# Patient Record
Sex: MALE | Race: WHITE | NOT HISPANIC OR LATINO | Employment: OTHER | URBAN - METROPOLITAN AREA
[De-identification: names, ages, dates, MRNs, and addresses within clinical notes are randomized per-mention and may not be internally consistent; named-entity substitution may affect disease eponyms.]

---

## 2022-09-23 ENCOUNTER — OFFICE VISIT (OUTPATIENT)
Dept: FAMILY MEDICINE CLINIC | Facility: CLINIC | Age: 47
End: 2022-09-23

## 2022-09-23 VITALS
TEMPERATURE: 98.4 F | SYSTOLIC BLOOD PRESSURE: 122 MMHG | OXYGEN SATURATION: 100 % | HEART RATE: 73 BPM | BODY MASS INDEX: 33.06 KG/M2 | DIASTOLIC BLOOD PRESSURE: 88 MMHG | HEIGHT: 69 IN | WEIGHT: 223.2 LBS

## 2022-09-23 DIAGNOSIS — I10 PRIMARY HYPERTENSION: ICD-10-CM

## 2022-09-23 DIAGNOSIS — E11.9 TYPE 2 DIABETES MELLITUS WITHOUT COMPLICATION, WITHOUT LONG-TERM CURRENT USE OF INSULIN: ICD-10-CM

## 2022-09-23 DIAGNOSIS — R59.0 THORACIC LYMPHADENOPATHY: ICD-10-CM

## 2022-09-23 DIAGNOSIS — N39.0 URINARY TRACT INFECTION WITH HEMATURIA, SITE UNSPECIFIED: Primary | ICD-10-CM

## 2022-09-23 DIAGNOSIS — R31.9 URINARY TRACT INFECTION WITH HEMATURIA, SITE UNSPECIFIED: Primary | ICD-10-CM

## 2022-09-23 DIAGNOSIS — Z71.6 ENCOUNTER FOR SMOKING CESSATION COUNSELING: ICD-10-CM

## 2022-09-23 LAB
EXPIRATION DATE: ABNORMAL
HBA1C MFR BLD: 7.5 %
Lab: ABNORMAL

## 2022-09-23 PROCEDURE — 83036 HEMOGLOBIN GLYCOSYLATED A1C: CPT | Performed by: STUDENT IN AN ORGANIZED HEALTH CARE EDUCATION/TRAINING PROGRAM

## 2022-09-23 PROCEDURE — 99204 OFFICE O/P NEW MOD 45 MIN: CPT | Performed by: STUDENT IN AN ORGANIZED HEALTH CARE EDUCATION/TRAINING PROGRAM

## 2022-09-23 PROCEDURE — 3051F HG A1C>EQUAL 7.0%<8.0%: CPT | Performed by: STUDENT IN AN ORGANIZED HEALTH CARE EDUCATION/TRAINING PROGRAM

## 2022-09-23 NOTE — PROGRESS NOTES
"Chief Complaint  Establish Care (Pt went to [facility] ER 9/17/22 was treated for UTI, stated his sugar was elevated, and his lymphnodes were enlarged on a CT that he had done, care everywhere updated) and Hypertension    Subjective        Pablo Wise presents to [facility]  History of Present Illness  Patient presenting to establish care and for ER follow-up due to cystitis and blood in his urine on 9/17/2022.  He was a previous patient of JAE Bedolla, but has not seen her for years and has never been seen in this clinic.  He reports being healthy for the past few years and having no medical problems until the day he went to the ER he was having bilateral back pain around his kidneys and had blood in his urine.  In the ER they did a CT scan, blood work, and UA, identifying cystitis.  He was started on antibiotic and scheduled for a follow-up with Dr. Terry, urology, on 9/28/2022 in [place].  He was also noted to have elevated blood sugar, with a CT showing mild hepatic steatosis and lymphadenopathy adjacent to the distal esophagus.    At this point, patient's symptoms have resolved.  He has been eating, drinking, urinating, having regular bowel movements.  He denies fevers.  He indicates he had an elevated A1c in the past and was diagnosed with T2DM, although he lost weight and improved his diet and was ultimately taken off of metformin.  He mentioned that he had gained some weight but his weight has overall been stable for the past few years.  He has smoked for a long time and continues to do so.          Objective   Vital Signs:  /88   Pulse 73   Temp 98.4 °F (36.9 °C)   Ht 175.3 cm (69\")   Wt 101 kg (223 lb 3.2 oz)   SpO2 100%   BMI 32.96 kg/m²   Estimated body mass index is 32.96 kg/m² as calculated from the following:    Height as of this encounter: 175.3 cm (69\").    Weight as of this encounter: 101 kg (223 lb 3.2 oz).          Physical Exam  Vitals reviewed. "   Constitutional:       General: He is not in acute distress.     Appearance: Normal appearance.   HENT:      Head: Normocephalic and atraumatic.   Eyes:      Extraocular Movements: Extraocular movements intact.      Conjunctiva/sclera: Conjunctivae normal.   Cardiovascular:      Rate and Rhythm: Normal rate and regular rhythm.      Heart sounds: Normal heart sounds. No murmur heard.    No friction rub. No gallop.   Pulmonary:      Effort: Pulmonary effort is normal.      Breath sounds: Normal breath sounds. No wheezing, rhonchi or rales.   Abdominal:      General: Bowel sounds are normal. There is no distension.      Palpations: Abdomen is soft.      Tenderness: There is no abdominal tenderness. There is no right CVA tenderness, left CVA tenderness or guarding.   Musculoskeletal:      Right lower leg: No edema.      Left lower leg: No edema.   Neurological:      General: No focal deficit present.      Mental Status: He is alert. Mental status is at baseline.        Result Review :                Assessment and Plan   Diagnoses and all orders for this visit:    1. Urinary tract infection with hematuria, site unspecified (Primary)    2. Type 2 diabetes mellitus without complication, without long-term current use of insulin (HCC)  -     POC Glycosylated Hemoglobin (Hb A1C)  -     metFORMIN (Glucophage) 500 MG tablet; Take 1 tablet by mouth 2 (Two) Times a Day With Meals.  Dispense: 180 tablet; Refill: 3    3. Primary hypertension    4. Thoracic lymphadenopathy  -     CT Chest With Contrast; Future    5. Encounter for smoking cessation counseling    UTI symptoms and hematuria resolved with antibiotics provided in ER on 9/17/2022.  Review of CT abdomen/pelvis from that visit shows suggestion of cystitis with bladder wall thickening.  Patient's smoking history mixed bladder wall thickening more concerning.  Encourage patient to keep the follow-up previously scheduled with Dr. Terry, urology, on 9/28/2022 in  [place].    CT also showing mild hepatic steatosis and lymphadenopathy adjacent to the distal esophagus.  Repeat chest CT ordered to further evaluate lymphadenopathy as recommended by radiologist.  Encouraged to improve diet to help with likely fatty liver and diabetes management.  Labs and UA from 917/22 ER visit reviewed and consistent with his acute symptoms.  We will recheck labs, including lipids, at next next visit during annual exam.    A1c in clinic today was 7.5.  Counseled on dietary improvement, exercise, weight loss.  Encouraged eye exam.  Denies vision change, neuropathy/loss of sensation in lower extremities.  Restarting metformin.  Recommended nutrition consult but patient declined at this time.    Counseled for smoking cessation.  Patient indicating he has tried in the past without success, even using Chantix.  Discussed taking a date, changing routine, considering nicotine replacement such as lozenges and gum when he is having craving.  Patient precontemplative.       Follow Up   Return in about 4 weeks (around 10/21/2022) for Annual physical, hematuria.  Patient was given instructions and counseling regarding his condition or for health maintenance advice. Please see specific information pulled into the AVS if appropriate.

## 2022-09-23 NOTE — PATIENT INSTRUCTIONS
Your A1c was 7.5 today.  Trying to cut back on sugary drinks and other sources of sugars/carbohydrates like desserts, breads, pizza, pasta can help you control your blood sugar with diet.  You may want to look at the whole food plant-based diet online, eats a very healthy diet for the whole family.  We will try to get your records from your emergency room visit and follow-up with you if there are other things we need to order in the near term.  Please be sure to keep your appointment with urology.

## 2022-09-26 ENCOUNTER — TELEPHONE (OUTPATIENT)
Dept: FAMILY MEDICINE CLINIC | Facility: CLINIC | Age: 47
End: 2022-09-26

## 2022-09-26 NOTE — TELEPHONE ENCOUNTER
Caller: Rosie Wise    Relationship: Emergency Contact    Best call back number:     Caller requesting test results: PATIENTS WIFE    What test was performed: CT SCAN    When was the test performed: 09/17/22    Where was the test performed: [facility]    Additional notes: PATIENTS WIFE ROSIE IS CALLING IN TO REQUEST THE PATIENTS RESULTS FROM HIS CT SCAN. SHE WANTS TO BE CALLED AS TO WHEN SHE CAN PICK THIS UP FROM THE OFFICE.

## 2022-09-26 NOTE — TELEPHONE ENCOUNTER
Caller: Rosie Wise    Relationship: Emergency Contact    Best call back number: [phone]*    What is the best time to reach you: ANYTIME    Who are you requesting to speak with (clinical staff, provider,  specific staff member): CLINICAL    What was the call regarding: PATIENT'S WIFE REQUEST A CALL BACK WITH THE STATUS OF THE CT CHEST REFERRAL.    Do you require a callback: YES

## 2022-09-30 ENCOUNTER — PATIENT ROUNDING (BHMG ONLY) (OUTPATIENT)
Dept: FAMILY MEDICINE CLINIC | Facility: CLINIC | Age: 47
End: 2022-09-30

## 2022-09-30 NOTE — PROGRESS NOTES
My name is Cecil Moya      I am the Practice Manager with   [facility] [address]  [facility] [address] [phone].      I am messaging to officially welcome you to our practice and ask about your recent visit.     Tell me about your visit with us. What things went well?         We're always looking for ways to make our patients' experiences even better. Do you have recommendations on ways we may improve?       Overall were you satisfied with your first visit to our practice?        Is there anything else I can do for you?       Thank you, and have a great day.

## 2022-10-05 ENCOUNTER — APPOINTMENT (OUTPATIENT)
Dept: CT IMAGING | Facility: HOSPITAL | Age: 47
End: 2022-10-05

## 2022-10-26 ENCOUNTER — OFFICE VISIT (OUTPATIENT)
Dept: FAMILY MEDICINE CLINIC | Facility: CLINIC | Age: 47
End: 2022-10-26

## 2022-10-26 VITALS
DIASTOLIC BLOOD PRESSURE: 88 MMHG | HEART RATE: 82 BPM | WEIGHT: 230.8 LBS | TEMPERATURE: 95.3 F | BODY MASS INDEX: 34.18 KG/M2 | OXYGEN SATURATION: 96 % | HEIGHT: 69 IN | SYSTOLIC BLOOD PRESSURE: 134 MMHG

## 2022-10-26 DIAGNOSIS — I10 PRIMARY HYPERTENSION: ICD-10-CM

## 2022-10-26 DIAGNOSIS — F17.200 TOBACCO DEPENDENCY: ICD-10-CM

## 2022-10-26 DIAGNOSIS — E11.9 TYPE 2 DIABETES MELLITUS WITHOUT COMPLICATION, WITHOUT LONG-TERM CURRENT USE OF INSULIN: Primary | ICD-10-CM

## 2022-10-26 LAB
ALBUMIN SERPL-MCNC: 4.4 G/DL (ref 3.5–5.2)
ALBUMIN/GLOB SERPL: 1.6 G/DL
ALP SERPL-CCNC: 86 U/L (ref 39–117)
ALT SERPL-CCNC: 16 U/L (ref 1–41)
AST SERPL-CCNC: 17 U/L (ref 1–40)
BASOPHILS # BLD AUTO: 0.09 10*3/MM3 (ref 0–0.2)
BASOPHILS NFR BLD AUTO: 1.2 % (ref 0–1.5)
BILIRUB SERPL-MCNC: 0.3 MG/DL (ref 0–1.2)
BUN SERPL-MCNC: 12 MG/DL (ref 6–20)
BUN/CREAT SERPL: 11.9 (ref 7–25)
CALCIUM SERPL-MCNC: 9.5 MG/DL (ref 8.6–10.5)
CHLORIDE SERPL-SCNC: 101 MMOL/L (ref 98–107)
CHOLEST SERPL-MCNC: 236 MG/DL (ref 0–200)
CO2 SERPL-SCNC: 28.1 MMOL/L (ref 22–29)
CREAT SERPL-MCNC: 1.01 MG/DL (ref 0.76–1.27)
EGFRCR SERPLBLD CKD-EPI 2021: 91.2 ML/MIN/1.73
EOSINOPHIL # BLD AUTO: 0.37 10*3/MM3 (ref 0–0.4)
EOSINOPHIL NFR BLD AUTO: 5.1 % (ref 0.3–6.2)
ERYTHROCYTE [DISTWIDTH] IN BLOOD BY AUTOMATED COUNT: 12.9 % (ref 12.3–15.4)
GLOBULIN SER CALC-MCNC: 2.8 GM/DL
GLUCOSE SERPL-MCNC: 210 MG/DL (ref 65–99)
HCT VFR BLD AUTO: 42.5 % (ref 37.5–51)
HDLC SERPL-MCNC: 37 MG/DL (ref 40–60)
HGB BLD-MCNC: 14.7 G/DL (ref 13–17.7)
IMM GRANULOCYTES # BLD AUTO: 0.03 10*3/MM3 (ref 0–0.05)
IMM GRANULOCYTES NFR BLD AUTO: 0.4 % (ref 0–0.5)
LDLC SERPL CALC-MCNC: 177 MG/DL (ref 0–100)
LYMPHOCYTES # BLD AUTO: 2.23 10*3/MM3 (ref 0.7–3.1)
LYMPHOCYTES NFR BLD AUTO: 30.8 % (ref 19.6–45.3)
MCH RBC QN AUTO: 29.9 PG (ref 26.6–33)
MCHC RBC AUTO-ENTMCNC: 34.6 G/DL (ref 31.5–35.7)
MCV RBC AUTO: 86.6 FL (ref 79–97)
MONOCYTES # BLD AUTO: 0.5 10*3/MM3 (ref 0.1–0.9)
MONOCYTES NFR BLD AUTO: 6.9 % (ref 5–12)
NEUTROPHILS # BLD AUTO: 4.03 10*3/MM3 (ref 1.7–7)
NEUTROPHILS NFR BLD AUTO: 55.6 % (ref 42.7–76)
NRBC BLD AUTO-RTO: 0 /100 WBC (ref 0–0.2)
PLATELET # BLD AUTO: 226 10*3/MM3 (ref 140–450)
POTASSIUM SERPL-SCNC: 4.8 MMOL/L (ref 3.5–5.2)
PROT SERPL-MCNC: 7.2 G/DL (ref 6–8.5)
RBC # BLD AUTO: 4.91 10*6/MM3 (ref 4.14–5.8)
SODIUM SERPL-SCNC: 137 MMOL/L (ref 136–145)
TRIGL SERPL-MCNC: 119 MG/DL (ref 0–150)
TSH SERPL DL<=0.005 MIU/L-ACNC: 1.81 UIU/ML (ref 0.27–4.2)
VLDLC SERPL CALC-MCNC: 22 MG/DL (ref 5–40)
WBC # BLD AUTO: 7.25 10*3/MM3 (ref 3.4–10.8)

## 2022-10-26 PROCEDURE — 99214 OFFICE O/P EST MOD 30 MIN: CPT | Performed by: STUDENT IN AN ORGANIZED HEALTH CARE EDUCATION/TRAINING PROGRAM

## 2022-10-26 NOTE — PATIENT INSTRUCTIONS
Keep up the good work on the diet changes.  Try to avoid fatty red meats and breads, as well as the sweets.  Do your best to cut the sugar out of the coffee.    Try to cut back on food intake.  Try to fill up on vegetables that do not have as many calories in them instead of meats and fried foods.    If you check your blood sugar at home, try to write it down and bring it next time.  The best time to check it is first thing in the morning before you have had anything to drink other than water.  Other times that are useful are before meals, 2 hours after meals, bedtime, and occasionally in the middle of the night.    Feel free to give us a call if you have concerns prior to next visit.

## 2022-10-26 NOTE — PROGRESS NOTES
"Chief Complaint  Type 2 diabetes mellitus without complication, without long and Follow-up    Subjective        Pablo Wise presents to [facility]  History of Present Illness  Pablo is presenting today for follow-up after cystitis 6 weeks ago, T2DM was also identified in the ER at that time.  He reports Dr. Terry, urology, perform cystoscopy on 9/28/2022 and found no abnormalities.  He also reports his repeat chest CT has been scheduled on 12/5/2022.    Patient reports cutting back significantly on sweets and try to eat more healthy since identifying elevated A1c of 7.5 on 9/23/2022.  He reports last eye exam was 6/2022.  He denies changes in vision, excessive thirst, excessive urination, changes in bowel or urinary routine, changes in sensation in extremities.  He reports his wife is checking his BG at home occasionally but did not bring measurements with him today.    Patient lives at home with wife.  He has 2 daughters, 1 is nearby, and the other is in [place].  He sees them regularly.  Reviewed family history.  Patient continues to smoke but has cut back.  He is now chewing gum when he gets craving in his truck and is not smoking is any cigarettes in his truck.  He works as a .    Objective   Vital Signs:  /88 (BP Location: Left arm, Patient Position: Sitting, Cuff Size: Adult)   Pulse 82   Temp 95.3 °F (35.2 °C)   Ht 175.3 cm (69\")   Wt 105 kg (230 lb 12.8 oz)   SpO2 96%   BMI 34.08 kg/m²   Estimated body mass index is 34.08 kg/m² as calculated from the following:    Height as of this encounter: 175.3 cm (69\").    Weight as of this encounter: 105 kg (230 lb 12.8 oz).          Physical Exam  Vitals reviewed.   Constitutional:       General: He is not in acute distress.     Appearance: Normal appearance.   HENT:      Head: Normocephalic and atraumatic.   Eyes:      Extraocular Movements: Extraocular movements intact.      Conjunctiva/sclera: " Order placed for FLU per Verbal Order from Dr. Paola Gómez on 10/30/2020 due to need.     FLU injection given in right deltoid 0.5 ml given in right arm IM, no reaction noted Conjunctivae normal.   Cardiovascular:      Rate and Rhythm: Normal rate and regular rhythm.      Heart sounds: Normal heart sounds. No murmur heard.    No friction rub. No gallop.   Pulmonary:      Effort: Pulmonary effort is normal.      Breath sounds: Normal breath sounds. No wheezing, rhonchi or rales.   Abdominal:      General: Bowel sounds are normal. There is no distension.      Palpations: Abdomen is soft.      Tenderness: There is no abdominal tenderness. There is no right CVA tenderness, left CVA tenderness or guarding.   Musculoskeletal:      Right lower leg: No edema.      Left lower leg: No edema.   Neurological:      General: No focal deficit present.      Mental Status: He is alert. Mental status is at baseline.   Psychiatric:         Mood and Affect: Mood normal.         Behavior: Behavior normal.        Result Review :                Assessment and Plan   Diagnoses and all orders for this visit:    1. Type 2 diabetes mellitus without complication, without long-term current use of insulin (HCC) (Primary)  -     CBC & Differential  -     Comprehensive Metabolic Panel  -     Lipid Panel  -     TSH Rfx On Abnormal To Free T4  -     Urinalysis With Microscopic - Urine, Clean Catch; Future  -     Microalbumin / Creatinine Urine Ratio - Urine, Clean Catch; Future  -     Microalbumin / Creatinine Urine Ratio - Urine, Clean Catch  -     Urinalysis With Microscopic - Urine, Clean Catch    2. Primary hypertension  -     CBC & Differential  -     Comprehensive Metabolic Panel  -     Lipid Panel  -     TSH Rfx On Abnormal To Free T4    3. Tobacco dependency    Pablo is presenting today for follow-up after cystitis 6 weeks ago, T2DM was also identified in the ER at that time.  He reports Dr. Terry, urology, perform cystoscopy on 9/28/2022 and found no abnormalities.  He also reports his repeat chest CT has been scheduled on 12/5/2022.    Discussed ways to improve diet to help manage blood glucose as well as  achieve meaningful weight loss.  Continuing metformin at current dosage until A1c is checked again in 3 months.  Discussed when to regularly check blood glucose with fingersticks at home.  Encouraged bringing BG log to next visit.  Discussed role for statins and ACE inhibitors with T2DM.  Awaiting lab results ordered today to determine appropriate medications to start for HTN and lipids.  Patient has had recent eye exam a few months ago.  He reports good sensation in his extremities bilaterally.    BP noted to be slightly above goal today.  Patient has been on lisinopril in the past.  Encouraged continuing to cut back on smoking and discussed importance of picking a quit date.         Follow Up   Return in about 3 months (around 1/26/2023), or T2DM, HTN, Weight loss, Smoking.  Patient was given instructions and counseling regarding his condition or for health maintenance advice. Please see specific information pulled into the AVS if appropriate.

## 2022-10-27 LAB
ALBUMIN/CREAT UR: <9 MG/G CREAT (ref 0–29)
APPEARANCE UR: CLEAR
BACTERIA #/AREA URNS HPF: NORMAL /HPF
BILIRUB UR QL STRIP: NEGATIVE
CASTS URNS MICRO: NORMAL
COLOR UR: YELLOW
CREAT UR-MCNC: 33.4 MG/DL
EPI CELLS #/AREA URNS HPF: NORMAL /HPF
GLUCOSE UR QL STRIP: ABNORMAL
HGB UR QL STRIP: NEGATIVE
KETONES UR QL STRIP: NEGATIVE
LEUKOCYTE ESTERASE UR QL STRIP: NEGATIVE
MICROALBUMIN UR-MCNC: <3 UG/ML
NITRITE UR QL STRIP: NEGATIVE
PH UR STRIP: 7.5 [PH] (ref 5–8)
PROT UR QL STRIP: NEGATIVE
RBC #/AREA URNS HPF: NORMAL /HPF
SP GR UR STRIP: 1.01 (ref 1–1.03)
UROBILINOGEN UR STRIP-MCNC: ABNORMAL MG/DL
WBC #/AREA URNS HPF: NORMAL /HPF

## 2022-10-28 ENCOUNTER — TELEPHONE (OUTPATIENT)
Dept: FAMILY MEDICINE CLINIC | Facility: CLINIC | Age: 47
End: 2022-10-28

## 2022-10-28 DIAGNOSIS — E11.9 TYPE 2 DIABETES MELLITUS WITHOUT COMPLICATION, WITHOUT LONG-TERM CURRENT USE OF INSULIN: Primary | ICD-10-CM

## 2022-10-28 NOTE — TELEPHONE ENCOUNTER
Caller: Rosie Wise    Relationship: Emergency Contact    Best call back number: [phone]    What medication are you requesting: GLUCOSE METER    If a prescription is needed, what is your preferred pharmacy and phone number: [facility] [id number] - [place], [place] - [address] [place] [address] AT  [address] - [phone]  - [phone] FX     Additional notes: PATIENTS WIFE STATED PATIENT IS NEEDING A GLUCOSE METER TO READ HIS SUGAR.

## 2022-10-30 RX ORDER — BLOOD-GLUCOSE METER
1 KIT MISCELLANEOUS AS NEEDED
Qty: 1 EACH | Refills: 0 | Status: SHIPPED | OUTPATIENT
Start: 2022-10-30 | End: 2022-11-03

## 2022-10-30 RX ORDER — LANCETS
EACH MISCELLANEOUS
Qty: 60 EACH | Refills: 12 | Status: SHIPPED | OUTPATIENT
Start: 2022-10-30

## 2022-10-31 ENCOUNTER — TELEPHONE (OUTPATIENT)
Dept: FAMILY MEDICINE CLINIC | Facility: CLINIC | Age: 47
End: 2022-10-31

## 2022-10-31 NOTE — TELEPHONE ENCOUNTER
Dr. Benz,    Patient's wife states Mr. Wise test his Blood sugar 6 times a day.  He will need more testing strips.    Please advise on filling for more test strips per day.    Maricel

## 2022-10-31 NOTE — TELEPHONE ENCOUNTER
Pharmacy Name:   [facility] [id number] - [address] AT  [address] - [phone]  - [phone]                    Pharmacy representative name:     Pharmacy representative phone number: [phone]    What medication are you calling in regards to:glucose blood test strip     What question does the pharmacy have: PRESCRIPTION QTY 60 STRIPS. IF PATIENT IS TESTING 6 TIMES PER DAY, THIS IS ONLY A 10 DAY SUPPLY OF STRIPS.  PLEASE ADVISE     Who is the provider that prescribed the medication: DEEPA      Additional notes:

## 2022-11-03 ENCOUNTER — TELEPHONE (OUTPATIENT)
Dept: FAMILY MEDICINE CLINIC | Facility: CLINIC | Age: 47
End: 2022-11-03

## 2022-11-03 DIAGNOSIS — E11.9 TYPE 2 DIABETES MELLITUS WITHOUT COMPLICATION, WITHOUT LONG-TERM CURRENT USE OF INSULIN: ICD-10-CM

## 2022-11-03 RX ORDER — BLOOD-GLUCOSE METER
1 KIT MISCELLANEOUS AS NEEDED
Qty: 1 EACH | Refills: 0 | Status: SHIPPED | OUTPATIENT
Start: 2022-11-03 | End: 2023-11-03

## 2022-11-03 NOTE — TELEPHONE ENCOUNTER
Pharmacy Name:      Pharmacy representative name: NITA    Pharmacy representative phone number: [phone]    What medication are you calling in regards to: LANCETS AND TEST STRIPS     What question does the pharmacy have:   SAYS THAT HE SHOULD TEST 6 TIMES PER DAY AND DO WE NEED 30 DAY SUPPLY.     Who is the provider that prescribed the medication: RAFAELA   normal...

## 2022-11-04 NOTE — TELEPHONE ENCOUNTER
Pharmacy called asking for clarification on his testing.  I advised it was 2 times daily.    The pharmacist stated it comes in 25 or 100 count strips.  I gave 100 strips with 1 refill.    Maricel

## 2022-12-19 ENCOUNTER — HOSPITAL ENCOUNTER (OUTPATIENT)
Dept: CT IMAGING | Facility: HOSPITAL | Age: 47
Discharge: HOME | End: 2022-12-19

## 2022-12-19 ENCOUNTER — APPOINTMENT (OUTPATIENT)
Dept: OTHER | Facility: HOSPITAL | Age: 47
End: 2022-12-19

## 2022-12-19 DIAGNOSIS — Z09 FOLLOW-UP EXAM: ICD-10-CM

## 2022-12-19 DIAGNOSIS — R59.0 THORACIC LYMPHADENOPATHY: ICD-10-CM

## 2022-12-19 LAB — CREAT BLDA-MCNC: 1.1 MG/DL (ref 0.6–1.3)

## 2022-12-19 PROCEDURE — 71260 CT THORAX DX C+: CPT

## 2022-12-19 PROCEDURE — 25010000002 IOPAMIDOL 61 % SOLUTION: Performed by: STUDENT IN AN ORGANIZED HEALTH CARE EDUCATION/TRAINING PROGRAM

## 2022-12-19 PROCEDURE — 82565 ASSAY OF CREATININE: CPT

## 2022-12-19 RX ADMIN — IOPAMIDOL 75 ML: 612 INJECTION, SOLUTION INTRAVENOUS at 10:48

## 2023-02-07 ENCOUNTER — OFFICE VISIT (OUTPATIENT)
Dept: FAMILY MEDICINE CLINIC | Facility: CLINIC | Age: 48
End: 2023-02-07
Payer: COMMERCIAL

## 2023-02-07 VITALS
SYSTOLIC BLOOD PRESSURE: 120 MMHG | OXYGEN SATURATION: 100 % | WEIGHT: 213.4 LBS | DIASTOLIC BLOOD PRESSURE: 80 MMHG | HEART RATE: 70 BPM | BODY MASS INDEX: 31.61 KG/M2 | TEMPERATURE: 98.4 F | HEIGHT: 69 IN

## 2023-02-07 DIAGNOSIS — F17.200 TOBACCO DEPENDENCY: ICD-10-CM

## 2023-02-07 DIAGNOSIS — E11.9 TYPE 2 DIABETES MELLITUS WITHOUT COMPLICATION, WITHOUT LONG-TERM CURRENT USE OF INSULIN: ICD-10-CM

## 2023-02-07 DIAGNOSIS — G93.39 OTHER POST INFECTION AND RELATED FATIGUE SYNDROMES: ICD-10-CM

## 2023-02-07 DIAGNOSIS — U07.1 COVID-19 VIRUS INFECTION: Primary | ICD-10-CM

## 2023-02-07 DIAGNOSIS — R06.02 SHORTNESS OF BREATH: ICD-10-CM

## 2023-02-07 LAB
EXPIRATION DATE: NORMAL
HBA1C MFR BLD: 8.1 %
Lab: 543

## 2023-02-07 PROCEDURE — 99214 OFFICE O/P EST MOD 30 MIN: CPT | Performed by: STUDENT IN AN ORGANIZED HEALTH CARE EDUCATION/TRAINING PROGRAM

## 2023-02-07 PROCEDURE — 3052F HG A1C>EQUAL 8.0%<EQUAL 9.0%: CPT | Performed by: STUDENT IN AN ORGANIZED HEALTH CARE EDUCATION/TRAINING PROGRAM

## 2023-02-07 PROCEDURE — 83036 HEMOGLOBIN GLYCOSYLATED A1C: CPT | Performed by: STUDENT IN AN ORGANIZED HEALTH CARE EDUCATION/TRAINING PROGRAM

## 2023-02-07 RX ORDER — ALBUTEROL SULFATE 90 UG/1
2 AEROSOL, METERED RESPIRATORY (INHALATION) EVERY 6 HOURS PRN
Qty: 18 G | Refills: 4 | Status: SHIPPED | OUTPATIENT
Start: 2023-02-07

## 2023-02-07 NOTE — PROGRESS NOTES
"Chief Complaint  Follow-up (Hospital follow up. Covid and Pneumonia.), Fatigue, and Diabetes    Subjective        Pablo Wise presents to [facility]  History of Present Illness  Pablo is an established patient presenting for follow-up after diagnosis with COVID on 2/2/2023.  He was started on Paxlovid at that time.  He reports continued fatigue, occasional shortness of breath, cough.  He denies fevers, palpitations, changes in hearing, changes in vision, headache.  He has been unable to perform his  job during this illness.  He has completed Paxlovid but is using an albuterol inhaler occasionally which he had from a previous illness which helps with his shortness of breath.    He reports improved diet since diagnosis of T2DM 9/2022.  He also reports compliance with his metformin, 500 mg twice daily.  He is reporting morning FBGs's in the low 200s.    He has also reported cutting back on smoking since being diagnosed with COVID.  He has been smoking no more than 3 cigarettes a day.    Objective   Vital Signs:  /80   Pulse 70   Temp 98.4 °F (36.9 °C)   Ht 175.3 cm (69\")   Wt 96.8 kg (213 lb 6.4 oz)   SpO2 100%   BMI 31.51 kg/m²   Estimated body mass index is 31.51 kg/m² as calculated from the following:    Height as of this encounter: 175.3 cm (69\").    Weight as of this encounter: 96.8 kg (213 lb 6.4 oz).             Physical Exam  Vitals and nursing note reviewed.   Constitutional:       General: He is not in acute distress.     Appearance: Normal appearance.   HENT:      Head: Normocephalic and atraumatic.   Eyes:      Extraocular Movements: Extraocular movements intact.      Conjunctiva/sclera: Conjunctivae normal.   Neck:      Trachea: No tracheal tenderness.   Cardiovascular:      Rate and Rhythm: Normal rate and regular rhythm.      Heart sounds: Normal heart sounds. No murmur heard.    No friction rub. No gallop.   Pulmonary:      Effort: Pulmonary " effort is normal.      Breath sounds: Normal breath sounds. No wheezing, rhonchi or rales.   Abdominal:      Tenderness: There is no right CVA tenderness or left CVA tenderness.   Musculoskeletal:      Cervical back: Neck supple.   Lymphadenopathy:      Cervical: No cervical adenopathy.   Skin:     Nails: There is clubbing.   Neurological:      General: No focal deficit present.      Mental Status: He is alert. Mental status is at baseline.   Psychiatric:         Mood and Affect: Mood normal.         Behavior: Behavior normal.        Result Review :                   Assessment and Plan   Diagnoses and all orders for this visit:    1. 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 Shortness of breath    4. Type 2 diabetes mellitus without complication, without long-term current use of insulin (HCC)  -     metFORMIN (Glucophage) 1000 MG tablet; Take 1 tablet by mouth 2 (Two) Times a Day With Meals.  Dispense: 180 tablet; Refill: 3  -     POC Glycosylated Hemoglobin (Hb A1C)  -     Ambulatory Referral to Diabetic Education    5. Tobacco dependency    Other orders  -     albuterol sulfate  (90 Base) MCG/ACT inhaler; Inhale 2 puffs Every 6 (Six) Hours As Needed for Wheezing.  Dispense: 18 g; Refill: 4    Pablo is an established patient presenting for follow-up after diagnosis with COVID on 2/2/2023.  He has completed Paxlovid but is using an albuterol inhaler occasionally which he had from a previous illness which helps with his shortness of breath.  Continues to have fatigue, occasional shortness of breath.  He is eating and drinking.  Regular bowel movements.  Refilling albuterol for his shortness of breath.  Providing work excuse to the end of the week.    T2DM: He is reporting morning FBGs's in the low 200s.  A1c today is 8.1, up from 7.5 on 9/23/2022.  Encouraged further improvement in diet.  Increasing metformin to 1000 mg twice daily.  Also providing  referral to diabetes educator.  Asked patient to bring his BG logs to next visit.    Smoking cessation: Patient considering trying to quit completely since he is now down to only 3 cigarettes a day.  Discussed use of nicotine replacement and changing routines to help with cessation.         Follow Up   Return in about 3 months (around 5/7/2023) for T2DM, Order repeat CT Chest.  Patient was given instructions and counseling regarding his condition or for health maintenance advice. Please see specific information pulled into the AVS if appropriate.

## 2023-05-01 ENCOUNTER — TELEPHONE (OUTPATIENT)
Dept: FAMILY MEDICINE CLINIC | Facility: CLINIC | Age: 48
End: 2023-05-01

## 2023-05-01 NOTE — TELEPHONE ENCOUNTER
Caller: Rosie Wise    Relationship: Emergency Contact    Best call back number:    [phone] (Home)         What orders are you requesting (i.e. lab or imaging): REPEAT CT CHEST     In what timeframe would the patient need to come in: ANY     Where will you receive your lab/imaging services: [facility]     Additional notes:  PLEASE ADVISE

## 2023-05-01 NOTE — TELEPHONE ENCOUNTER
Yes, radiology recommended in 6 months.  That would be 6/2023.  We can discuss at his 5/8/23 appointment.  Thank you.

## 2023-05-01 NOTE — TELEPHONE ENCOUNTER
Dr Benz  Please see message below from Mr. Wise. Requesting   REPEAT CT CHEST  LOV 2/7/2023  Please advice   Transition of Care Plan   RUR- 19% Moderate Risk   DISPOSITION: The disposition plan is home with family assistance,  pending medical progression and medical progression.  F/U with PCP/Specialist     Transport: [facility]/family     This CM acknowledged IP consult for hospice. At 9:46am - CM submitted hospice referral via [facility] for review.     JAIMEE Bowden, CRM, LMHP-e  Available in [employer]

## 2023-05-01 NOTE — TELEPHONE ENCOUNTER
Spoke with Mrs Wise. She stated they requested a CT chest because it was recommended in patient's last visit. She said that patient is not feeling sick at the moment. She was informed that radiology recommended repeating CT in 6 months.

## 2023-05-01 NOTE — TELEPHONE ENCOUNTER
Please find out why patient is asking for CT.  His last CT was 12/22 and radiology recommended repeating in 6 months.  If he is asking because he is not feeling well, he would need to be seen.  He could move up his appointment scheduled on 5/8/23 if needed.  Thank you.

## 2023-10-20 ENCOUNTER — TELEPHONE (OUTPATIENT)
Dept: FAMILY MEDICINE CLINIC | Facility: CLINIC | Age: 48
End: 2023-10-20

## 2023-10-20 ENCOUNTER — OFFICE VISIT (OUTPATIENT)
Dept: FAMILY MEDICINE CLINIC | Facility: CLINIC | Age: 48
End: 2023-10-20
Payer: COMMERCIAL

## 2023-10-20 VITALS
HEART RATE: 72 BPM | OXYGEN SATURATION: 95 % | BODY MASS INDEX: 31.55 KG/M2 | DIASTOLIC BLOOD PRESSURE: 80 MMHG | HEIGHT: 69 IN | SYSTOLIC BLOOD PRESSURE: 110 MMHG | TEMPERATURE: 97.7 F | WEIGHT: 213 LBS

## 2023-10-20 DIAGNOSIS — E11.9 TYPE 2 DIABETES MELLITUS WITHOUT COMPLICATION, WITHOUT LONG-TERM CURRENT USE OF INSULIN: ICD-10-CM

## 2023-10-20 DIAGNOSIS — Z11.59 ENCOUNTER FOR HEPATITIS C SCREENING TEST FOR LOW RISK PATIENT: ICD-10-CM

## 2023-10-20 DIAGNOSIS — M76.32 IT BAND SYNDROME, LEFT: Primary | ICD-10-CM

## 2023-10-20 DIAGNOSIS — J43.9 PULMONARY EMPHYSEMA, UNSPECIFIED EMPHYSEMA TYPE: ICD-10-CM

## 2023-10-20 DIAGNOSIS — R59.0 HILAR LYMPHADENOPATHY: ICD-10-CM

## 2023-10-20 DIAGNOSIS — E78.00 HYPERCHOLESTEROLEMIA: ICD-10-CM

## 2023-10-20 LAB
A/C: <30
EXPIRATION DATE: NORMAL
Lab: NORMAL
POC CREATININE URINE: 10
POC MICROALBUMIN URINE: 10

## 2023-10-20 RX ORDER — SYRING-NEEDL,DISP,INSUL,0.3 ML 30 GX5/16"
1 SYRINGE, EMPTY DISPOSABLE MISCELLANEOUS 2 TIMES DAILY
Qty: 60 EACH | Refills: 11 | Status: SHIPPED | OUTPATIENT
Start: 2023-10-20

## 2023-10-20 RX ORDER — CYCLOBENZAPRINE HCL 5 MG
5 TABLET ORAL 3 TIMES DAILY PRN
Qty: 90 TABLET | Refills: 0 | Status: SHIPPED | OUTPATIENT
Start: 2023-10-20

## 2023-10-20 RX ORDER — NAPROXEN 500 MG/1
500 TABLET ORAL 2 TIMES DAILY WITH MEALS
Qty: 30 TABLET | Refills: 1 | Status: SHIPPED | OUTPATIENT
Start: 2023-10-20

## 2023-10-20 RX ORDER — METFORMIN HYDROCHLORIDE 500 MG/1
500 TABLET, EXTENDED RELEASE ORAL
Qty: 30 TABLET | Refills: 2 | Status: SHIPPED | OUTPATIENT
Start: 2023-10-20

## 2023-10-20 RX ORDER — BLOOD-GLUCOSE METER
1 KIT MISCELLANEOUS AS NEEDED
Qty: 1 EACH | Refills: 0 | Status: SHIPPED | OUTPATIENT
Start: 2023-10-20

## 2023-10-20 RX ORDER — LANCETS
EACH MISCELLANEOUS
Qty: 60 EACH | Refills: 12 | Status: SHIPPED | OUTPATIENT
Start: 2023-10-20 | End: 2023-10-20

## 2023-10-20 NOTE — Clinical Note
Please inform Pablo that his knee x-ray was normal.  Please find out how his knee is doing at this point.  I believe he has something called IT band syndrome.  This can resolve on its own, but he would recover much faster with physical therapy.  If he is agreeable, let me know and I will place the referral.  Thank you.

## 2023-10-20 NOTE — TELEPHONE ENCOUNTER
Pharmacy Name: [facility] [id number] - [address] AT  [address] - [phone]  - [phone] FX     Reference Number (if applicable): N/A    Pharmacy representative name: ADITYA    Pharmacy representative phone number: [phone]     What medication are you calling in regards to: Lancets (accu-chek multiclix) lancets & glucose monitoring kit (FREESTYLE) monitoring kit     Who is the provider that prescribed the medication: DR. ARENAS    Additional notes: FOR INSURANCE PURPOSES PLEASE SEND NEW PRESCRIPTIONS FOR NO NAME BRAND LANCETS & METER.

## 2023-10-20 NOTE — PROGRESS NOTES
Chief Complaint  Medication Review and Knee Pain (Left x 4 days - NO INJURIES)    Subjective        Pablo Wise presents to [facility]  History of Present Illness  Pablo is an established patient presenting with his wife for T2DM, chronic episodic shortness of breath, and knee pain.   He has hypercholesterolemia, T2DM, emphysema, is a smoker, and a history of cystitis managed by Dr. Terry, urology, on 9/28/2022.  12/19/22 CT Chest with contrast showing mildly enlarged left hilar lymph node, recommended follow-up CT Chest with contrast in 6 months.     L knee started hurting 4d ago.  Has had knee problems in the past.  Aching pain, becomes sharp if straightening out.  Wakes him up at night.  No known inciting event.  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    T2DM: Reports he has been unable to take metformin IR due to frequent loose stools.  Asked for alternative.  Is not checking home BG.    Episodic shortness of breath: Primarily with exertion.  Typically uses his rescue inhaler 2 times daily.  Has never formally been diagnosed with lung pathology, although review of 12/19/2022 CT chest report identifies emphysematous change.  He is continuing to smoke.    Hypercholesterolemia: Identified on 10/26/2022 labs which show total cholesterol 236, , HDL 37, normal triglycerides.    FH:     Medical: Mother with pancreatic cancer, father with lung cancer    Siblings: ???    Children: 2 daughters, one close by, one in [place], sees them regularly    Reproductive: deferred.      SH    Nicotine: 15 PY smoker    Illicit drugs: Deferred    EtOH: Deferred    Home: Lives at home with wife, feels safe    Work:  Working roll-back .    Social: Deferred    Preventative:    Colonoscopy: N/A    Vaccinations: Deferred    Eye: Deferred    Dental: Deferred    Foot exams: Deferred    Exercise: Climbing in/out of truck and active at work    Diet: cutting back significantly on sweets and  "try to eat more healthy     Objective   Vital Signs:  /80 (BP Location: Right arm, Patient Position: Sitting, Cuff Size: Adult)   Pulse 72   Temp 97.7 °F (36.5 °C) (Temporal)   Ht 175.3 cm (69\")   Wt 96.6 kg (213 lb)   SpO2 95%   BMI 31.45 kg/m²   Estimated body mass index is 31.45 kg/m² as calculated from the following:    Height as of this encounter: 175.3 cm (69\").    Weight as of this encounter: 96.6 kg (213 lb).             Physical Exam  Vitals and nursing note reviewed.   Constitutional:       General: He is not in acute distress.     Appearance: Normal appearance.   HENT:      Head: Normocephalic and atraumatic.   Eyes:      Extraocular Movements: Extraocular movements intact.      Conjunctiva/sclera: Conjunctivae normal.   Cardiovascular:      Rate and Rhythm: Normal rate and regular rhythm.      Heart sounds: Normal heart sounds. No murmur heard.     No friction rub. No gallop.   Pulmonary:      Effort: Pulmonary effort is normal.      Breath sounds: Normal breath sounds. No wheezing, rhonchi or rales.   Abdominal:      General: Bowel sounds are normal. There is no distension.      Palpations: Abdomen is soft.      Tenderness: There is no abdominal tenderness. There is no guarding.   Musculoskeletal:      Right lower leg: No edema.      Left lower leg: Tenderness (lateral tenderness greatest just distal to knee, tender proximally as well) present. No swelling, deformity or bony tenderness. No edema.      Comments: L lateral knee pain with valgus stress and anterior drawer test.  No knee laxity with anterior/posterior drawer test.  No joint line tenderness.  No edema.   Skin:     General: Skin is warm and dry.   Neurological:      General: No focal deficit present.      Mental Status: He is alert. Mental status is at baseline.   Psychiatric:         Mood and Affect: Mood normal.         Behavior: Behavior normal.        Result Review :                   Assessment and Plan   Diagnoses and all " orders for this visit:    1. It band syndrome, left (Primary)  -     Cancel: XR Knee 3 View Left; Future  -     naproxen (Naprosyn) 500 MG tablet; Take 1 tablet by mouth 2 (Two) Times a Day With Meals.  Dispense: 30 tablet; Refill: 1  -     cyclobenzaprine (FLEXERIL) 5 MG tablet; Take 1 tablet by mouth 3 (Three) Times a Day As Needed for Muscle Spasms.  Dispense: 90 tablet; Refill: 0  -     XR Knee 3 View Left; Future    2. Type 2 diabetes mellitus without complication, without long-term current use of insulin  -     POC Microalbumin  -     CBC & Differential; Future  -     Comprehensive Metabolic Panel; Future  -     Lipid Panel; Future  -     TSH Rfx On Abnormal To Free T4; Future  -     Discontinue: Lancets (accu-chek multiclix) lancets; Check blood glucose first thing each morning and occasionally before a meal and at bedtime.  Check a total of 2 times daily and record your results.  Dispense: 60 each; Refill: 12  -     Discontinue: glucose blood test strip; Check blood glucose first thing each morning and occasionally before a meal and at bedtime.  Check a total of 2 times daily and record your results.  Dispense: 60 each; Refill: 12  -     metFORMIN ER (GLUCOPHAGE-XR) 500 MG 24 hr tablet; Take 1 tablet by mouth Daily With Breakfast.  Dispense: 30 tablet; Refill: 2  -     Lancet Device misc; Use 1 each 2 (Two) Times a Day. Check morning fasting blood sugar daily.  Also check before a meal during the day or at bedtime occasionally.  Check a total of two times daily.  Dispense: 60 each; Refill: 11  -     glucose monitor monitoring kit; Use 1 each As Needed (Use whe checking blood sugar).  Dispense: 1 each; Refill: 0  -     glucose blood test strip; Check morning fasting blood sugar daily.  Also check before a meal during the day or at bedtime occasionally.  Check a total of two times daily.  Dispense: 100 each; Refill: 11    3. Pulmonary emphysema, unspecified emphysema type  Comments:  Identified on 12/19/22 CT  Chest  Orders:  -     Umeclidinium-Vilanterol (ANORO ELLIPTA) 62.5-25 MCG/ACT aerosol powder  inhaler; Inhale 1 puff Daily.  Dispense: 60 each; Refill: 11    4. Hypercholesterolemia  -     Lipid Panel; Future    5. Hilar lymphadenopathy  Comments:  12/19/2022 CT chest  Orders:  -     CT Chest With Contrast; Future    6. Encounter for hepatitis C screening test for low risk patient  -     Hepatitis C Antibody; Future    L IT band syndrome: History, symptoms, exam consistent with IT band syndrome.  Recommended ice/NSAIDs, reducing use.  We will check x-ray to ensure no bony abnormality.  Will likely refer to PT for strengthening.  Also provided muscle relaxer.      T2DM: Patient agrees to trial of metformin ER.  We will discuss diet, additional medications at next visit.  Patient agrees to have blood drawn for lab work prior to next visit.  Microalbumin creatinine ratio normal in clinic today.    Episodic shortness of breath: Since patient is using his rescue inhaler twice daily, and with emphysematous changes found on 12/2022 CT chest, patient very likely has COPD.  We will advance inhaler to Anoro.  Encourage smoking cessation.  Further management at next visit.      Hypercholesterolemia: Patient reports improving diet since previous cholesterol check.  He agrees to rechecking labs prior to next clinic visit.  Further management pending lab results.      Hilar lymphadenopathy: Identified on 12/19/22 CT Chest, recommended follow-up CT Chest with contrast in 6 months.  CT chest ordered today.    Preventative:    Colonoscopy: N/A    Vaccinations: Deferred    Eye: Deferred    Dental: Deferred    Foot exams: Deferred    Nicotine: 15 PY smoker, had cut back with lozenges to 3/day    Illicit drugs: Deferred    EtOH: Deferred    Home: Lives at home with wife, feels safe    Work:  Working roll-back .    Social: Deferred    Exercise: Climbing in/out of truck and active at work    Diet: cutting back significantly on  sweets and try to eat more healthy     10/23/23  Knee XR referral.  Will place pt referral if patient is agreeable.         Current Outpatient Medications:     albuterol sulfate  (90 Base) MCG/ACT inhaler, Inhale 2 puffs Every 6 (Six) Hours As Needed for Wheezing., Disp: 18 g, Rfl: 4    cyclobenzaprine (FLEXERIL) 5 MG tablet, Take 1 tablet by mouth 3 (Three) Times a Day As Needed for Muscle Spasms., Disp: 90 tablet, Rfl: 0    glucose blood test strip, Check morning fasting blood sugar daily.  Also check before a meal during the day or at bedtime occasionally.  Check a total of two times daily., Disp: 100 each, Rfl: 11    glucose monitor monitoring kit, Use 1 each As Needed (Use whe checking blood sugar)., Disp: 1 each, Rfl: 0    Lancet Device misc, Use 1 each 2 (Two) Times a Day. Check morning fasting blood sugar daily.  Also check before a meal during the day or at bedtime occasionally.  Check a total of two times daily., Disp: 60 each, Rfl: 11    metFORMIN ER (GLUCOPHAGE-XR) 500 MG 24 hr tablet, Take 1 tablet by mouth Daily With Breakfast., Disp: 30 tablet, Rfl: 2    naproxen (Naprosyn) 500 MG tablet, Take 1 tablet by mouth 2 (Two) Times a Day With Meals., Disp: 30 tablet, Rfl: 1    Umeclidinium-Vilanterol (ANORO ELLIPTA) 62.5-25 MCG/ACT aerosol powder  inhaler, Inhale 1 puff Daily., Disp: 60 each, Rfl: 11       Follow Up   Return in about 2 weeks (around 11/3/2023) for Annual physical, T2DM.  Patient was given instructions and counseling regarding his condition or for health maintenance advice. Please see specific information pulled into the AVS if appropriate.

## 2023-10-21 PROBLEM — J45.909 REACTIVE AIRWAY DISEASE: Status: ACTIVE | Noted: 2023-10-21

## 2023-10-23 DIAGNOSIS — M76.32 IT BAND SYNDROME, LEFT: ICD-10-CM

## 2023-10-24 ENCOUNTER — TELEPHONE (OUTPATIENT)
Dept: FAMILY MEDICINE CLINIC | Facility: CLINIC | Age: 48
End: 2023-10-24
Payer: COMMERCIAL

## 2023-10-24 NOTE — TELEPHONE ENCOUNTER
Pablo's wife, Rosie, called stating after talking to Pablo,  he said there is no way he would be able to do PT with his job.  There isn't time for it.    It is still hurting but it is better with the muscle relaxer.    Maricel

## 2023-10-24 NOTE — TELEPHONE ENCOUNTER
Please inform Pablo that I do strongly recommend PT for this type of injury.  If he does not do PT, I would encourage him to look online for some specific stretches for IT band syndrome that can help.  Thank you.

## 2023-10-24 NOTE — TELEPHONE ENCOUNTER
Called and spoke with his wife, Rosie.    She said the medication was making it feel better.  I advised her of Dr. Westfall message.      She was going to get with Palbo and call us back to see if he was agreeable to PT.    Maricel

## 2023-10-24 NOTE — TELEPHONE ENCOUNTER
----- Message from Alexandre Benz MD sent at 10/23/2023  4:57 PM EDT -----  Please inform Pablo that his knee x-ray was normal.  Please find out how his knee is doing at this point.  I believe he has something called IT band syndrome.  This can resolve on its own, but he would recover much faster with physical therapy.  If he is agreeable, let me know and I will place the referral.  Thank you.

## 2023-11-08 ENCOUNTER — HOSPITAL ENCOUNTER (OUTPATIENT)
Dept: CT IMAGING | Facility: HOSPITAL | Age: 48
Discharge: HOME | End: 2023-11-08
Admitting: STUDENT IN AN ORGANIZED HEALTH CARE EDUCATION/TRAINING PROGRAM
Payer: COMMERCIAL

## 2023-11-08 DIAGNOSIS — R59.0 HILAR LYMPHADENOPATHY: ICD-10-CM

## 2023-11-08 PROCEDURE — 25510000001 IOPAMIDOL 61 % SOLUTION: Performed by: STUDENT IN AN ORGANIZED HEALTH CARE EDUCATION/TRAINING PROGRAM

## 2023-11-08 PROCEDURE — 71260 CT THORAX DX C+: CPT

## 2023-11-08 RX ADMIN — IOPAMIDOL 75 ML: 612 INJECTION, SOLUTION INTRAVENOUS at 14:46

## 2023-11-17 ENCOUNTER — OFFICE VISIT (OUTPATIENT)
Dept: FAMILY MEDICINE CLINIC | Facility: CLINIC | Age: 48
End: 2023-11-17
Payer: COMMERCIAL

## 2023-11-17 VITALS
SYSTOLIC BLOOD PRESSURE: 120 MMHG | HEART RATE: 90 BPM | BODY MASS INDEX: 32.11 KG/M2 | OXYGEN SATURATION: 97 % | DIASTOLIC BLOOD PRESSURE: 82 MMHG | WEIGHT: 216.8 LBS | HEIGHT: 69 IN | TEMPERATURE: 98 F

## 2023-11-17 DIAGNOSIS — E78.00 HYPERCHOLESTEROLEMIA: ICD-10-CM

## 2023-11-17 DIAGNOSIS — Z23 NEED FOR PNEUMOCOCCAL VACCINATION: ICD-10-CM

## 2023-11-17 DIAGNOSIS — K22.89 ESOPHAGEAL THICKENING: ICD-10-CM

## 2023-11-17 DIAGNOSIS — Z00.00 ENCOUNTER FOR ANNUAL PHYSICAL EXAM: Primary | ICD-10-CM

## 2023-11-17 DIAGNOSIS — K21.9 GASTROESOPHAGEAL REFLUX DISEASE, UNSPECIFIED WHETHER ESOPHAGITIS PRESENT: ICD-10-CM

## 2023-11-17 DIAGNOSIS — E66.09 CLASS 1 OBESITY DUE TO EXCESS CALORIES WITH SERIOUS COMORBIDITY AND BODY MASS INDEX (BMI) OF 32.0 TO 32.9 IN ADULT: ICD-10-CM

## 2023-11-17 DIAGNOSIS — M76.32 IT BAND SYNDROME, LEFT: ICD-10-CM

## 2023-11-17 DIAGNOSIS — Z12.11 COLON CANCER SCREENING: ICD-10-CM

## 2023-11-17 DIAGNOSIS — J43.9 PULMONARY EMPHYSEMA, UNSPECIFIED EMPHYSEMA TYPE: ICD-10-CM

## 2023-11-17 DIAGNOSIS — Z71.6 ENCOUNTER FOR SMOKING CESSATION COUNSELING: ICD-10-CM

## 2023-11-17 DIAGNOSIS — R59.0 HILAR LYMPHADENOPATHY: ICD-10-CM

## 2023-11-17 DIAGNOSIS — E27.9 ADRENAL ABNORMALITY: ICD-10-CM

## 2023-11-17 DIAGNOSIS — E11.9 TYPE 2 DIABETES MELLITUS WITHOUT COMPLICATION, WITHOUT LONG-TERM CURRENT USE OF INSULIN: ICD-10-CM

## 2023-11-17 LAB
EXPIRATION DATE: ABNORMAL
HBA1C MFR BLD: 7.4 % (ref 4.5–5.7)
Lab: ABNORMAL

## 2023-11-17 RX ORDER — LANCETS 33 GAUGE
EACH MISCELLANEOUS
COMMUNITY
Start: 2023-10-26

## 2023-11-17 RX ORDER — LANCETS 30 GAUGE
EACH MISCELLANEOUS
COMMUNITY
Start: 2023-10-20

## 2023-11-17 RX ORDER — FAMOTIDINE 20 MG/1
20 TABLET, FILM COATED ORAL 2 TIMES DAILY PRN
Qty: 60 TABLET | Refills: 5 | Status: SHIPPED | OUTPATIENT
Start: 2023-11-17

## 2023-11-17 RX ORDER — ALBUTEROL SULFATE 90 UG/1
2 AEROSOL, METERED RESPIRATORY (INHALATION) EVERY 6 HOURS PRN
Qty: 18 G | Refills: 5 | Status: SHIPPED | OUTPATIENT
Start: 2023-11-17

## 2023-11-17 RX ORDER — METFORMIN HYDROCHLORIDE 500 MG/1
1000 TABLET, EXTENDED RELEASE ORAL
Qty: 180 TABLET | Refills: 3 | Status: SHIPPED | OUTPATIENT
Start: 2023-11-17

## 2023-11-17 RX ORDER — CYCLOBENZAPRINE HCL 5 MG
5 TABLET ORAL 3 TIMES DAILY PRN
Qty: 90 TABLET | Refills: 1 | Status: SHIPPED | OUTPATIENT
Start: 2023-11-17

## 2023-11-17 RX ORDER — NICOTINE 21-14-7MG
KIT TRANSDERMAL
Qty: 1 EACH | Refills: 0 | Status: SHIPPED | OUTPATIENT
Start: 2023-11-17

## 2023-11-17 RX ORDER — NAPROXEN 500 MG/1
500 TABLET ORAL 2 TIMES DAILY WITH MEALS
Qty: 30 TABLET | Refills: 1 | Status: SHIPPED | OUTPATIENT
Start: 2023-11-17

## 2023-11-17 RX ORDER — OMEPRAZOLE 20 MG/1
20 CAPSULE, DELAYED RELEASE ORAL DAILY PRN
COMMUNITY

## 2023-11-17 NOTE — PROGRESS NOTES
Chief Complaint  Annual Exam and Diabetes    Subjective        Pablo Wise presents to [facility]  History of Present Illness  Pablo is an established patient presenting with his wife for annual physical and management of T2DM and other medical concerns.   He has hypercholesterolemia, T2DM, emphysema, is a smoker, and a history of cystitis managed by YAW Terry MD, urology.  11/8/2023 CT Chest with contrast showing stable hilar adenopathy, recommended follow-up CT Chest with contrast in 6 months.  CT also identified emphysema and thickening of left adrenal gland and distal esophagus.    T2DM: Reports improving diet but still drinks too many sugary drinks.  Has been doing well with metformin, 500 mg daily.  Reports -190 and 2h post evening postprandial -160.  Denies any neuropathy.  A1c in clinic today 7.4, down from 8.1 on 2/7/2023.    Pulmonary emphysema/hilar lymphadenopathy: Identified symptomatically and on CT chest.  Denies shortness of breath overall and reports daily compliance with Anoro.  Only uses rescue inhaler about 2 times per week.  Denies any nighttime awakenings.    Hypercholesterolemia: Reports improved diet over the past few months.  10/27/2023 labs showing total cholesterol improving from 236 to 199, LDL improving from 177 to -143, but triglycerides going up to 152.    GERD/esophageal thickening/colon cancer screening: Patient reports occasional acid reflux and he takes over-the-counter omeprazole as needed.  He asks about finding of esophageal thickening on 11/8/2023.  He is agreeable to beginning colon cancer screening.  Denies blood in stool.  Reports daily bowel movements that are not hard or loose.    IT band syndrome: Reports that his symptoms may be slowly improving.  He is doing exercises that he found online, taking muscle relaxer and naproxen in the morning.    Smoking cessation: Reports trying Chantix in the past but he could not  "tolerate it.  He would like to quit, but has had difficulty.  He smokes at home, and at work.  He has tried nicotine gum/lozenges without success.    FH:     Medical: Mother with pancreatic cancer, father with lung cancer    Siblings: ???    Children: 2 daughters, one close by, one in [place], sees them regularly     SH    Nicotine: 72 PY smoker, would like to quit.  Started when 11yo, 2 PPD    Illicit drugs: Never    EtOH: 2 EtOH/yr    Home: Lives at home with wife, feels safe, sees children regularly    Work:  Working roll-back .    Social: None     Preventative:    Colonoscopy: N/A    Vaccinations: Deferred    Eye: Deferredin past yr    Dental: Deferrednone    Foot exams: Deferred    Exercise: Climbing in/out of truck and active at work    Diet: cutting back significantly on sweets and try to eat more healthy diet.   Drinking water, Coke.    Objective   Vital Signs:  /82   Pulse 90   Temp 98 °F (36.7 °C)   Ht 175.3 cm (69\")   Wt 98.3 kg (216 lb 12.8 oz)   SpO2 97%   BMI 32.02 kg/m²   Estimated body mass index is 32.02 kg/m² as calculated from the following:    Height as of this encounter: 175.3 cm (69\").    Weight as of this encounter: 98.3 kg (216 lb 12.8 oz).       BMI is >= 30 and <35. (Class 1 Obesity). The following options were offered after discussion;: exercise counseling/recommendations, nutrition counseling/recommendations, and provided whole food plant-based diet handout and reviewed with patient and spouse.      Physical Exam  Vitals and nursing note reviewed.   Constitutional:       General: He is not in acute distress.     Appearance: Normal appearance.   HENT:      Head: Normocephalic and atraumatic.   Eyes:      Extraocular Movements: Extraocular movements intact.      Conjunctiva/sclera: Conjunctivae normal.   Cardiovascular:      Rate and Rhythm: Normal rate and regular rhythm.      Pulses: Normal pulses.      Heart sounds: Normal heart sounds. No murmur heard.     No " friction rub. No gallop.   Pulmonary:      Effort: Pulmonary effort is normal.      Breath sounds: Normal breath sounds. No wheezing, rhonchi or rales.   Abdominal:      General: Bowel sounds are normal. There is no distension.      Palpations: Abdomen is soft.      Tenderness: There is no abdominal tenderness. There is no right CVA tenderness, left CVA tenderness or guarding.   Musculoskeletal:      Right lower leg: No edema.      Left lower leg: No edema.   Skin:     General: Skin is warm and dry.      Capillary Refill: Capillary refill takes less than 2 seconds.   Neurological:      General: No focal deficit present.      Mental Status: He is alert. Mental status is at baseline.   Psychiatric:         Mood and Affect: Mood normal.         Behavior: Behavior normal.        Result Review :                   Assessment and Plan   Diagnoses and all orders for this visit:    1. Encounter for annual physical exam (Primary)    2. Type 2 diabetes mellitus without complication, without long-term current use of insulin  -     POC Glycosylated Hemoglobin (Hb A1C)  -     metFORMIN ER (GLUCOPHAGE-XR) 500 MG 24 hr tablet; Take 2 tablets by mouth Daily With Breakfast.  Dispense: 180 tablet; Refill: 3    3. Pulmonary emphysema, unspecified emphysema type  Comments:  Identified on 11/8/2023 CT  Orders:  -     albuterol sulfate  (90 Base) MCG/ACT inhaler; Inhale 2 puffs Every 6 (Six) Hours As Needed for Wheezing.  Dispense: 18 g; Refill: 5    4. Hilar lymphadenopathy  Comments:  Stable on 11/8/2023 CT, recommended annual lung cancer screening CT.  Orders:  -     CT Chest With Contrast; Future    5. Hypercholesterolemia    6. Gastroesophageal reflux disease, unspecified whether esophagitis present  -     Ambulatory Referral to Gastroenterology  -     famotidine (Pepcid) 20 MG tablet; Take 1 tablet by mouth 2 (Two) Times a Day As Needed for Heartburn.  Dispense: 60 tablet; Refill: 5    7. Esophageal  thickening  Comments:  Identified on 11/8/2023 CT  Orders:  -     Ambulatory Referral to Gastroenterology    8. Colon cancer screening  -     Ambulatory Referral to Gastroenterology    9. It band syndrome, left  -     cyclobenzaprine (FLEXERIL) 5 MG tablet; Take 1 tablet by mouth 3 (Three) Times a Day As Needed for Muscle Spasms.  Dispense: 90 tablet; Refill: 1  -     naproxen (Naprosyn) 500 MG tablet; Take 1 tablet by mouth 2 (Two) Times a Day With Meals.  Dispense: 30 tablet; Refill: 1    10. Adrenal abnormality  Comments:  Left adrenal thickening identified on 11/8/2023 CT    11. Need for pneumococcal vaccination  -     Pneumococcal Conjugate Vaccine 20-Valent All    12. Encounter for smoking cessation counseling  -     Nicotine 21-14-7 MG/24HR kit; Use as directed on box  Dispense: 1 each; Refill: 0    13. Class 1 obesity due to excess calories with serious comorbidity and body mass index (BMI) of 32.0 to 32.9 in adult    T2DM: Increasing metformin dosage.  Discussed further dietary improvements.  Starting smoking cessation.  Discuss statin and DFE next visit.    Pulmonary emphysema/hilar lymphadenopathy: Repeat CT in 6 months.  Continue current Anoro and Albuterol since he reports good control with these medications. Discuss baseline PFT next visit.    Hypercholesterolemia: Good improvement in cholesterol since improved diet.  Encouraged further dietary improvements as noted below.  Discuss adding statin next visit due to T2DM.    GERD/esophageal thickening/colon cancer screening: Prescribing Famotidine for symptoms since patient is only using omeprazole PRN for symptoms.  Will refer to gastroenterology since CT showing esophageal thickening and patient is due for screening colonoscopy.    IT band syndrome: Patient continues to decline PT referral.  Continuing Flexeril.  Encouraged weaning off high dose Naproxen.  Encouraged continuing home exercises.    Left adrenal thickening: Identified on 11/8/2023 CT.   Follow on next CT.  Discuss follow-up with urology at next visit.    Preventative:    Colonoscopy: N/A    Vaccinations: Deferred    Eye: Deferredin past yr    Dental: Deferrednone    Foot exams: Deferred    Nicotine: 72 PY smoker, would like to quit.  Started when 13yo, 2 PPD    Home: Lives at home with wife, feels safe, sees children regularly, 1 daughter is local, 1 in [place]    Work:  Working roll-back .    Social: None      Exercise: Nothing formal, Climbing in/out of truck and active at work    Diet: cutting back significantly on sweets and try to eat more healthy diet.   Drinking water, Coke.  Reviewed whole foods plant based diet handout with patient and spouse today.    Smoking cessation: Pablo Wise  reports that he has quit smoking. His smoking use included cigarettes. He started smoking about 36 years ago. He has a 70.00 pack-year smoking history. He has never used smokeless tobacco.. I have educated him on the risk of diseases from using tobacco products such as cancer, COPD, and heart disease.     I advised him to quit and he is willing to quit. We have discussed the following method/s for tobacco cessation:  Education Material Counseling [place] OTC Cessation Products Prescription Medicaiton nicotine replacement .  Together we have set a quit date for  12/31/2023 .  He will follow up with me in  4  month or sooner to check on his progress.    I spent 6 minutes counseling the patient.              Follow Up   Return in about 4 months (around 3/18/2024) for T2DM, DFE, statin, smoking cessation, PFT, L adrenal thickening.  Patient was given instructions and counseling regarding his condition or for health maintenance advice. Please see specific information pulled into the AVS if appropriate.